# Patient Record
Sex: MALE | Race: WHITE | NOT HISPANIC OR LATINO | Employment: FULL TIME | URBAN - METROPOLITAN AREA
[De-identification: names, ages, dates, MRNs, and addresses within clinical notes are randomized per-mention and may not be internally consistent; named-entity substitution may affect disease eponyms.]

---

## 2017-08-27 ENCOUNTER — HOSPITAL ENCOUNTER (EMERGENCY)
Facility: HOSPITAL | Age: 37
Discharge: HOME | End: 2017-08-27
Attending: EMERGENCY MEDICINE
Payer: COMMERCIAL

## 2017-08-27 VITALS
SYSTOLIC BLOOD PRESSURE: 138 MMHG | WEIGHT: 173 LBS | RESPIRATION RATE: 16 BRPM | HEART RATE: 98 BPM | DIASTOLIC BLOOD PRESSURE: 80 MMHG | OXYGEN SATURATION: 100 % | TEMPERATURE: 99 F | HEIGHT: 72 IN | BODY MASS INDEX: 23.43 KG/M2

## 2017-08-27 DIAGNOSIS — K11.20 SIALOADENITIS: Primary | ICD-10-CM

## 2017-08-27 LAB
BASOPHILS # BLD AUTO: 0.02 K/UL
BASOPHILS NFR BLD: 0.1 %
BUN SERPL-MCNC: 15 MG/DL (ref 6–30)
CHLORIDE SERPL-SCNC: 99 MMOL/L (ref 95–110)
CREAT SERPL-MCNC: 0.9 MG/DL (ref 0.5–1.4)
DIFFERENTIAL METHOD: ABNORMAL
EOSINOPHIL # BLD AUTO: 0.1 K/UL
EOSINOPHIL NFR BLD: 0.5 %
ERYTHROCYTE [DISTWIDTH] IN BLOOD BY AUTOMATED COUNT: 13.1 %
GLUCOSE SERPL-MCNC: 90 MG/DL (ref 70–110)
HCT VFR BLD AUTO: 42.3 %
HCT VFR BLD CALC: 45 %PCV (ref 36–54)
HGB BLD-MCNC: 14.6 G/DL
LYMPHOCYTES # BLD AUTO: 2.2 K/UL
LYMPHOCYTES NFR BLD: 16.6 %
MCH RBC QN AUTO: 30.8 PG
MCHC RBC AUTO-ENTMCNC: 34.5 G/DL
MCV RBC AUTO: 89 FL
MONOCYTES # BLD AUTO: 1.2 K/UL
MONOCYTES NFR BLD: 9.1 %
NEUTROPHILS # BLD AUTO: 9.9 K/UL
NEUTROPHILS NFR BLD: 73.5 %
PLATELET # BLD AUTO: 178 K/UL
PMV BLD AUTO: 10.4 FL
POC IONIZED CALCIUM: 1.3 MMOL/L (ref 1.06–1.42)
POC TCO2 (MEASURED): 29 MMOL/L (ref 23–29)
POTASSIUM BLD-SCNC: 4 MMOL/L (ref 3.5–5.1)
RBC # BLD AUTO: 4.74 M/UL
SAMPLE: NORMAL
SODIUM BLD-SCNC: 137 MMOL/L (ref 136–145)
WBC # BLD AUTO: 13.44 K/UL

## 2017-08-27 PROCEDURE — 63600175 PHARM REV CODE 636 W HCPCS: Performed by: PHYSICIAN ASSISTANT

## 2017-08-27 PROCEDURE — 86735 MUMPS ANTIBODY: CPT | Mod: 91

## 2017-08-27 PROCEDURE — 86703 HIV-1/HIV-2 1 RESULT ANTBDY: CPT

## 2017-08-27 PROCEDURE — 99284 EMERGENCY DEPT VISIT MOD MDM: CPT | Mod: 25

## 2017-08-27 PROCEDURE — 25500020 PHARM REV CODE 255: Performed by: EMERGENCY MEDICINE

## 2017-08-27 PROCEDURE — 25000003 PHARM REV CODE 250: Performed by: PHYSICIAN ASSISTANT

## 2017-08-27 PROCEDURE — 86665 EPSTEIN-BARR CAPSID VCA: CPT

## 2017-08-27 PROCEDURE — 96374 THER/PROPH/DIAG INJ IV PUSH: CPT

## 2017-08-27 PROCEDURE — 85025 COMPLETE CBC W/AUTO DIFF WBC: CPT

## 2017-08-27 PROCEDURE — 96361 HYDRATE IV INFUSION ADD-ON: CPT

## 2017-08-27 PROCEDURE — 99284 EMERGENCY DEPT VISIT MOD MDM: CPT | Mod: ,,, | Performed by: EMERGENCY MEDICINE

## 2017-08-27 RX ORDER — OXYCODONE AND ACETAMINOPHEN 5; 325 MG/1; MG/1
1 TABLET ORAL EVERY 6 HOURS PRN
Qty: 18 TABLET | Refills: 0 | Status: SHIPPED | OUTPATIENT
Start: 2017-08-27

## 2017-08-27 RX ORDER — AMOXICILLIN AND CLAVULANATE POTASSIUM 875; 125 MG/1; MG/1
1 TABLET, FILM COATED ORAL 2 TIMES DAILY
Qty: 14 TABLET | Refills: 0 | Status: SHIPPED | OUTPATIENT
Start: 2017-08-27

## 2017-08-27 RX ORDER — AMOXICILLIN AND CLAVULANATE POTASSIUM 875; 125 MG/1; MG/1
1 TABLET, FILM COATED ORAL
Status: COMPLETED | OUTPATIENT
Start: 2017-08-27 | End: 2017-08-27

## 2017-08-27 RX ORDER — DIPHENHYDRAMINE HYDROCHLORIDE 50 MG/ML
25 INJECTION INTRAMUSCULAR; INTRAVENOUS
Status: DISCONTINUED | OUTPATIENT
Start: 2017-08-27 | End: 2017-08-27

## 2017-08-27 RX ORDER — OXYCODONE HYDROCHLORIDE 5 MG/1
5 TABLET ORAL
Status: COMPLETED | OUTPATIENT
Start: 2017-08-27 | End: 2017-08-27

## 2017-08-27 RX ORDER — KETOROLAC TROMETHAMINE 30 MG/ML
10 INJECTION, SOLUTION INTRAMUSCULAR; INTRAVENOUS
Status: COMPLETED | OUTPATIENT
Start: 2017-08-27 | End: 2017-08-27

## 2017-08-27 RX ORDER — METHYLPREDNISOLONE SOD SUCC 125 MG
125 VIAL (EA) INJECTION
Status: DISCONTINUED | OUTPATIENT
Start: 2017-08-27 | End: 2017-08-27

## 2017-08-27 RX ORDER — FAMOTIDINE 10 MG/ML
20 INJECTION INTRAVENOUS
Status: DISCONTINUED | OUTPATIENT
Start: 2017-08-27 | End: 2017-08-27

## 2017-08-27 RX ADMIN — SODIUM CHLORIDE 1000 ML: 0.9 INJECTION, SOLUTION INTRAVENOUS at 12:08

## 2017-08-27 RX ADMIN — IOHEXOL 100 ML: 350 INJECTION, SOLUTION INTRAVENOUS at 01:08

## 2017-08-27 RX ADMIN — AMOXICILLIN AND CLAVULANATE POTASSIUM 1 TABLET: 875; 125 TABLET, FILM COATED ORAL at 02:08

## 2017-08-27 RX ADMIN — OXYCODONE HYDROCHLORIDE 5 MG: 5 TABLET ORAL at 01:08

## 2017-08-27 RX ADMIN — KETOROLAC TROMETHAMINE 10 MG: 30 INJECTION, SOLUTION INTRAMUSCULAR at 12:08

## 2017-08-27 NOTE — ED PROVIDER NOTES
Encounter Date: 8/27/2017    SCRIBE #1 NOTE: I, Anthony Robles, am scribing for, and in the presence of,  Dr. Moya. I have scribed the following portions of the note - the APC attestation.       History     Chief Complaint   Patient presents with    Facial Swelling     facial swelling  getting worse since yesterday - difficulty swallowing.     Allergic Reaction     35 y/o M with no significant PMHx presents to the ED c/o facial and neck swelling. He had some facial swelling and pain yesterday that improved with OTC ibuprofen. He woke up this morning and noticed significant facial swelling (R>L) and neck swelling. He does report some dysphagia and subjective fever. He denies tongue swelling, lip swelling, throat swelling, trismus. He denies any recent dental procedures, dental pain or new medications. He denies chills, chest pain, SOB, abdominal pain, n/v, neck stiffness, headache, ear pain, changes in vision.       The history is provided by the patient.     Review of patient's allergies indicates:  No Known Allergies  Past Medical History:   Diagnosis Date    ADHD (attention deficit hyperactivity disorder)      History reviewed. No pertinent surgical history.  Family History   Problem Relation Age of Onset    Cancer Mother      ovarian     Diabetes Mother     Hypertension Father     Depression Father      Social History   Substance Use Topics    Smoking status: Never Smoker    Smokeless tobacco: Never Used    Alcohol use 1.5 oz/week     3 Standard drinks or equivalent per week      Comment: 1 x week     Review of Systems   Constitutional: Negative for chills and fever.   HENT: Positive for facial swelling and trouble swallowing. Negative for congestion, dental problem, ear pain, rhinorrhea, sore throat and voice change.         No tongue or lip swelling.  No throat swelling.   Eyes: Negative for photophobia and visual disturbance.   Respiratory: Negative for shortness of breath.    Cardiovascular:  Negative for chest pain.   Gastrointestinal: Negative for abdominal pain, constipation, diarrhea, nausea and vomiting.   Genitourinary: Negative for dysuria.   Musculoskeletal: Negative for neck pain and neck stiffness.   Skin: Negative for wound.   Neurological: Negative for syncope, weakness, numbness and headaches.   Psychiatric/Behavioral: Negative for confusion.       Physical Exam     Initial Vitals [08/27/17 1208]   BP Pulse Resp Temp SpO2   130/73 108 16 98.9 °F (37.2 °C) 97 %      MAP       92         Physical Exam    Nursing note and vitals reviewed.  Constitutional: He appears well-developed and well-nourished. He is not diaphoretic. No distress.   HENT:   Head: Atraumatic.   Right Ear: Tympanic membrane, external ear and ear canal normal.   Left Ear: Tympanic membrane, external ear and ear canal normal.   Mouth/Throat: Oropharynx is clear and moist and mucous membranes are normal. No trismus in the jaw. No uvula swelling.   Bilateral lower facial swelling noted (R>L) with tenderness. No facial cellulitis.   Neck: Normal range of motion. Neck supple. No spinous process tenderness and no muscular tenderness present. Normal range of motion present. No neck rigidity.   Anterior neck swelling with tender cervical lymphadenopathy noted   Cardiovascular: Regular rhythm and normal heart sounds. Tachycardia present.  Exam reveals no gallop and no friction rub.    No murmur heard.  Pulmonary/Chest: Breath sounds normal. He has no wheezes. He has no rhonchi. He has no rales.   Abdominal: Soft. Bowel sounds are normal. There is no tenderness. There is no rebound and no guarding.   Musculoskeletal: Normal range of motion.   Neurological: He is alert and oriented to person, place, and time.   Skin: Skin is warm and dry. No rash noted. No erythema.   Psychiatric: He has a normal mood and affect.         ED Course   Procedures  Labs Reviewed   CBC W/ AUTO DIFFERENTIAL - Abnormal; Notable for the following:        Result  Value    WBC 13.44 (*)     Gran # 9.9 (*)     Mono # 1.2 (*)     Gran% 73.5 (*)     Lymph% 16.6 (*)     All other components within normal limits   MEGAN-JERRY VIRUS ANTIBODY PANEL   MUMPS VIRUS ANTIBODIES, IGG AND IGM   HIV 1 / 2 ANTIBODY   ISTAT PROCEDURE        Imaging Results          CT Soft Tissue Neck With Contrast (Final result)  Result time 08/27/17 14:00:14    Final result by Rip Holbrook MD (08/27/17 14:00:14)                 Impression:        Diffuse sialoadenitis without associated sialolith or abscess.    Asymmetric inflammation along the platysma, greater on the right than the left, and mild cervical adenopathy, likely reactive.    ______________________________________     Electronically signed by resident: DURAN PASCAL MD  Date:     08/27/17  Time:    13:46            As the supervising and teaching physician, I personally reviewed the images and resident's interpretation and I agree with the findings.          Electronically signed by: RIP HOLBROOK  Date:     08/27/17  Time:    14:00              Narrative:    HISTORY: 36-year-old male with right worse than left lower facial and neck swelling.    TECHNIQUE: Axial CT images acquired from the skull base through the thoracic inlet after administration of 100 cc of Omnipaque 350  IV contrast.  Multiplanar reconstructions provided for review.    COMPARISON: N/A    FINDINGS:     Aerodigestive tract: Normal.    Lymph nodes: Mildly prominent cervical lymph nodes bilaterally, the majority of which are not pathologically enlarged. An enlarged node is present in the right level II measuring 1.2 cm in short axis.    Salivary glands: Diffusely swollen heterogeneous parotids and submandibular glands bilaterally, without focal ovale or sialolith.  Small amount of inflammatory fat stranding/fluid is present adjacent to the salivary glands.    Thyroid: Normal.    Thoracic inlet: Normal.    Vascular structures: Normal.    Orbits/paranasal sinuses/skull base:  Normal.    Cervical spine: Mild disc/endplate degeneration with mild bilateral uncovertebral hypertrophy at C5-C6 resulting in mild foraminal stenoses.    Other findings: Mild soft tissue thickening and inflammation extends in the subcutaneous tissues along the platysma musculature, greater on the right than on the left.                                 Medical Decision Making:   History:   Old Medical Records: I decided to obtain old medical records.  Clinical Tests:   Lab Tests: Ordered and Reviewed  Radiological Study: Ordered and Reviewed  Other:   I have discussed this case with another health care provider.       APC / Resident Notes:   37 y/o M with no significant PMHx presents to the ED c/o facial and neck swelling.  Tachycardic. Regular rhythm. Lungs CTA. Abdomen soft. No neck stiffness. Bilateral lower facial swelling noted (R>L). No facial cellulitis. No posterior oropharyngeal erythema or edema appreciated. Tender anterior cervical lymph nodes. No signs of AOM. No trismus. DDx includes but is not limited to parotiditis, infection, sialadenitis. mumps. Will get labs and CT neck.     CBC shows leukocytosis with WBC 13.44. Chem8 with no abnormalities.    CT neck shows diffuse sialoadenitis without sialolith or abscess. Asymmetric inflammation along the playsma, R>L and mild cervical adenopathy.    Etiology possibly due to mumps. Labs added - Mumps antibodies, EBV antibody, HIV.     Stable for discharge.    He was discharged with prescriptions for augmentin (first dose given in the ED) and percocet.  He will follow up with Infectious Disease - Dr Carr.  All of the patient's questions were answered.  I reviewed the patient's chart, labs, and imaging and discussed the case with my supervising physician.          Scribe Attestation:   Scribe #1: I performed the above scribed service and the documentation accurately describes the services I performed. I attest to the accuracy of the note.    Attending  Attestation:     Physician Attestation Statement for NP/PA:   I have conducted a face to face encounter with this patient in addition to the NP/PA, due to Medical Complexity    Other NP/PA Attestation Additions:    History of Present Illness: Pt presenting with facial swelling.   Physical Exam: On exam pt with swelling to salivary glands, oropharynx clear, no acute distress,    Medical Decision Making: Will obtain CT neck, labs and discuss case with internal medicine. Possibly mumps. Will DC with Augmentin.        Physician Attestation for Scribe:  Physician Attestation Statement for Scribe #1: I, Dr. Moya, reviewed documentation, as scribed by Anthony Robles in my presence, and it is both accurate and complete.                 ED Course     Clinical Impression:   The encounter diagnosis was Sialoadenitis.    Disposition:   Disposition: Discharged  Condition: Stable                        Yolette Zambrano PA-C  08/27/17 8345

## 2017-08-27 NOTE — ED NOTES
Pt presented to the ED c/o facial swelling and neck swelling. Airway patent. Pt alert. Pt states it hurts to swallow. Pt denies sob. Pt states the swelling started yesterday.

## 2017-08-27 NOTE — ED NOTES
Pt identifiers Yosvany Arguetahecked and correct  LOC: The patient is awake, alert, aware of environment with an appropriate affect. Oriented x3, speaking appropriately  APPEARANCE: Pt resting comfortably, in no acute distress, pt is clean and well groomed, clothing properly fastened  SKIN: Skin warm, dry and intact, normal skin turgor, moist mucus membranes, facial and neck swelling noted.   RESPIRATORY: Airway is open and patent, respirations are spontaneous, even and unlabored, normal effort and rate  CARDIAC: Normal rate and rhythm, no peripheral edema noted, capillary refill < 3 seconds, bilateral radial pulses 2+  ABDOMEN: Soft, non tender, non distended. Bowel sounds present x 4 quadrants.    NEUROLOGIC: PERRLA, facial expression is symmetrical, patient moving all extremities spontaneously, normal sensation in all extremities when touched with a finger.  Follows all commands appropriately  MUSCULOSKELETAL: No obvious deformities.

## 2017-08-28 LAB — HIV 1+2 AB+HIV1 P24 AG SERPL QL IA: NEGATIVE

## 2017-08-29 LAB
EBV EA AB TITR SER: 6.8 U/ML
EBV NA IGG SER QL: 263 U/ML
EBV VCA IGG SER QL: 217 U/ML
EBV VCA IGM SER-ACNC: <10 U/ML

## 2017-08-30 LAB
MUV IGG SER IA-ACNC: 2
MUV IGG SER QL IA: POSITIVE
MUV IGM SER IA-ACNC: 0.07 (ref 0–0.79)
MUV IGM SER QL IA: NEGATIVE

## 2017-08-31 ENCOUNTER — OFFICE VISIT (OUTPATIENT)
Dept: INFECTIOUS DISEASES | Facility: CLINIC | Age: 37
End: 2017-08-31
Payer: COMMERCIAL

## 2017-08-31 VITALS
SYSTOLIC BLOOD PRESSURE: 125 MMHG | HEIGHT: 72 IN | DIASTOLIC BLOOD PRESSURE: 73 MMHG | BODY MASS INDEX: 25.59 KG/M2 | TEMPERATURE: 98 F | WEIGHT: 188.94 LBS | HEART RATE: 72 BPM

## 2017-08-31 DIAGNOSIS — I88.9 CERVICAL LYMPHADENITIS: Primary | ICD-10-CM

## 2017-08-31 PROCEDURE — 99203 OFFICE O/P NEW LOW 30 MIN: CPT | Mod: S$GLB,,, | Performed by: INTERNAL MEDICINE

## 2017-08-31 PROCEDURE — 99999 PR PBB SHADOW E&M-EST. PATIENT-LVL III: CPT | Mod: PBBFAC,,, | Performed by: INTERNAL MEDICINE

## 2017-08-31 RX ORDER — BUPROPION HYDROCHLORIDE 150 MG/1
150 TABLET ORAL 2 TIMES DAILY
Refills: 2 | COMMUNITY
Start: 2017-06-02 | End: 2017-08-31

## 2017-08-31 NOTE — PROGRESS NOTES
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    Chief Complaint:No chief complaint on file.      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  On Saturday morning he wok up and noticed enlarged lymph nodes.  Took advil and it improved.  Woke up Sunday and right side was severely swollen and red.  Then left side.  Both sides were swollen.Took advil.  Then went to work.  Throat then began swelling.  He presented to the ER.  Imaging suggested swollen salivary glands.  HIV and EBV testing was performed and wasn't remarkable.  He was discharged on augmentin empirically.  He is feeling much better today.    No travel history.  He doesn't own any pets.      Review of Systems   Constitution: Negative for chills, decreased appetite, fever, weakness, malaise/fatigue, night sweats, weight gain and weight loss.   HENT: Positive for ear pain. Negative for congestion, headaches, hearing loss, hoarse voice, sore throat and tinnitus.    Eyes: Negative for blurred vision, redness and visual disturbance.   Cardiovascular: Negative for chest pain, leg swelling and palpitations.   Respiratory: Negative for cough, hemoptysis, shortness of breath and sputum production.    Hematologic/Lymphatic: Positive for adenopathy. Does not bruise/bleed easily.   Skin: Negative for dry skin, itching, rash and suspicious lesions.   Musculoskeletal: Negative for back pain, joint pain, myalgias and neck pain.   Gastrointestinal: Negative for abdominal pain, constipation, diarrhea, heartburn, nausea and vomiting.   Genitourinary: Negative for dysuria, flank pain, frequency, hematuria, hesitancy and urgency.   Neurological: Negative for dizziness, numbness and paresthesias.   Psychiatric/Behavioral: Negative for depression and memory loss. The patient does not have insomnia and is not  nervous/anxious.      Objective:   Physical Exam   Constitutional: He is oriented to person, place, and time. He appears well-developed and well-nourished. No distress.   HENT:   Head: Normocephalic and atraumatic.   Right Ear: External ear normal.   Left Ear: External ear normal.   Nose: Nose normal.   Mouth/Throat: Oropharynx is clear and moist. No oropharyngeal exudate.   Eyes: Conjunctivae and EOM are normal. Pupils are equal, round, and reactive to light. Right eye exhibits no discharge. Left eye exhibits no discharge. No scleral icterus.   Neck: Normal range of motion. Neck supple. No JVD present. No tracheal deviation present. No thyromegaly present.   Cardiovascular: Normal rate, regular rhythm, normal heart sounds and intact distal pulses.  Exam reveals no gallop and no friction rub.    No murmur heard.  Pulmonary/Chest: Effort normal and breath sounds normal. No stridor. No respiratory distress. He has no wheezes. He has no rales. He exhibits no tenderness.   Abdominal: Soft. Bowel sounds are normal. He exhibits no distension and no mass. There is no tenderness. There is no rebound and no guarding.   Musculoskeletal: Normal range of motion. He exhibits no edema or tenderness.   Lymphadenopathy:     He has no cervical adenopathy.   Neurological: He is alert and oriented to person, place, and time. He has normal reflexes. He displays normal reflexes. No cranial nerve deficit. He exhibits normal muscle tone. Coordination normal.   Skin: Skin is warm. No rash noted. He is not diaphoretic. No erythema. No pallor.   Psychiatric: He has a normal mood and affect. His behavior is normal. Judgment and thought content normal.   Nursing note and vitals reviewed.    Assessment:       1. Cervical lymphadenitis        The cause of his cervical lymphadenitis is not clear.  Work up has included serologies for HIV and EBV which hasn't been remarkable.  Suspect some type of viral syndrome.  Will check CMV today.    Plan:        Cervical lymphadenitis  -     CBC auto differential; Future; Expected date: 08/31/2017  -     Cytomegalovirus (Cmv) Ab, Igm; Future; Expected date: 08/31/2017  -     Cytomegalovirus antibody, IgG; Future; Expected date: 08/31/2018

## 2017-11-02 ENCOUNTER — TELEPHONE (OUTPATIENT)
Dept: INTERNAL MEDICINE | Facility: CLINIC | Age: 37
End: 2017-11-02

## 2017-11-02 NOTE — TELEPHONE ENCOUNTER
----- Message from Maritza Wilhelm sent at 11/2/2017 11:39 AM CDT -----  Contact: Self. 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 Not in office this week but back in Tuesday next week.

## 2018-07-13 ENCOUNTER — TELEPHONE (OUTPATIENT)
Dept: PHARMACY | Facility: CLINIC | Age: 38
End: 2018-07-13

## 2018-08-07 ENCOUNTER — APPOINTMENT (RX ONLY)
Dept: URBAN - METROPOLITAN AREA CLINIC 39 | Facility: CLINIC | Age: 38
Setting detail: DERMATOLOGY
End: 2018-08-07

## 2019-04-11 RX ORDER — DEXTROAMPHETAMINE SACCHARATE, AMPHETAMINE ASPARTATE, DEXTROAMPHETAMINE SULFATE AND AMPHETAMINE SULFATE 7.5; 7.5; 7.5; 7.5 MG/1; MG/1; MG/1; MG/1
30 TABLET ORAL 2 TIMES DAILY
Qty: 60 TABLET | Refills: 0 | Status: CANCELLED | OUTPATIENT
Start: 2019-04-11 | End: 2019-07-10

## 2019-05-07 ENCOUNTER — OFFICE VISIT (OUTPATIENT)
Dept: DERMATOLOGY | Facility: CLINIC | Age: 39
End: 2019-05-07
Payer: COMMERCIAL

## 2019-05-07 DIAGNOSIS — B08.1 MOLLUSCUM CONTAGIOSUM: Primary | ICD-10-CM

## 2019-05-07 DIAGNOSIS — B36.0 TINEA VERSICOLOR: ICD-10-CM

## 2019-05-07 PROCEDURE — 99212 OFFICE O/P EST SF 10 MIN: CPT | Mod: 25,S$GLB,, | Performed by: NURSE PRACTITIONER

## 2019-05-07 PROCEDURE — 17110 PR DESTRUCTION BENIGN LESIONS UP TO 14: ICD-10-PCS | Mod: S$GLB,,, | Performed by: NURSE PRACTITIONER

## 2019-05-07 PROCEDURE — 99999 PR PBB SHADOW E&M-EST. PATIENT-LVL II: CPT | Mod: PBBFAC,,, | Performed by: NURSE PRACTITIONER

## 2019-05-07 PROCEDURE — 99999 PR PBB SHADOW E&M-EST. PATIENT-LVL II: ICD-10-PCS | Mod: PBBFAC,,, | Performed by: NURSE PRACTITIONER

## 2019-05-07 PROCEDURE — 87220 PR  TISSUE EXAM BY KOH: ICD-10-PCS | Mod: S$GLB,,, | Performed by: NURSE PRACTITIONER

## 2019-05-07 PROCEDURE — 17110 DESTRUCTION B9 LES UP TO 14: CPT | Mod: S$GLB,,, | Performed by: NURSE PRACTITIONER

## 2019-05-07 PROCEDURE — 99212 PR OFFICE/OUTPT VISIT, EST, LEVL II, 10-19 MIN: ICD-10-PCS | Mod: 25,S$GLB,, | Performed by: NURSE PRACTITIONER

## 2019-05-07 PROCEDURE — 87220 TISSUE EXAM FOR FUNGI: CPT | Mod: S$GLB,,, | Performed by: NURSE PRACTITIONER

## 2019-05-07 RX ORDER — FLUCONAZOLE 200 MG/1
TABLET ORAL
Qty: 4 TABLET | Refills: 0 | Status: SHIPPED | OUTPATIENT
Start: 2019-05-07

## 2019-05-07 RX ORDER — KETOCONAZOLE 20 MG/G
CREAM TOPICAL
Qty: 60 G | Refills: 3 | Status: SHIPPED | OUTPATIENT
Start: 2019-05-07

## 2019-05-07 NOTE — PROGRESS NOTES
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   Chief Complaint   Patient presents with    Rash     neck and chest , [id number], spreading, no tx     Folliculitis     abdomen, [id number], raised, bleed at times, no tx      Rash  - Initial  Affected locations: neck and chest  Duration: 2 months  Severity: mild to moderate  Timing: constant  Relieving factors/Treatments tried: nothing  Improvement on treatment: no relief    Folliculitis  - Initial  Affected locations: torso  Duration: 2 months  Signs / symptoms: bleeding  Severity: mild to moderate  Timing: constant  Aggravated by: pressure, picking and friction  Relieving factors/Treatments tried: antibiotics  Improvement on treatment: no relief        Review of Systems   Constitutional: Negative for fever, chills and fatigue.   Skin: Positive for rash.        Objective:    Physical Exam   Constitutional: He appears well-developed and well-nourished. No distress.   Neurological: He is alert and oriented to person, place, and time. He is not disoriented.   Psychiatric: He has a normal mood and affect.   Skin:   Areas Examined (abnormalities noted in diagram):   Head / Face Inspection Performed  Neck Inspection Performed  Chest / Axilla Inspection Performed  Back Inspection Performed  RUE Inspected  LUE Inspection Performed              Diagram Legend     Erythematous scaling macule/papule c/w actinic keratosis       Vascular papule c/w angioma      Pigmented verrucoid papule/plaque c/w seborrheic keratosis      Yellow umbilicated papule c/w sebaceous hyperplasia      Irregularly shaped tan macule c/w lentigo     1-2 mm smooth white papules consistent with Milia      Movable subcutaneous cyst with punctum c/w epidermal inclusion cyst      Subcutaneous movable cyst c/w pilar cyst      Firm pink to brown papule c/w dermatofibroma      Pedunculated fleshy papule(s) c/w skin tag(s)      Evenly pigmented macule c/w junctional nevus      Mildly variegated pigmented, slightly irregular-bordered macule c/w mildly atypical nevus      Flesh colored to evenly pigmented papule c/w intradermal nevus       Pink pearly papule/plaque c/w basal cell carcinoma      Erythematous hyperkeratotic cursted plaque c/w SCC      Surgical scar with no sign of skin cancer recurrence      Open and closed comedones      Inflammatory papules and pustules      Verrucoid papule consistent consistent with wart     Erythematous eczematous patches and plaques     Dystrophic onycholytic nail with subungual debris c/w onychomycosis     Umbilicated papule    Erythematous-base heme-crusted tan verrucoid plaque consistent with inflamed seborrheic keratosis     Erythematous Silvery Scaling Plaque c/w Psoriasis     See annotation      Assessment / Plan:        Molluscum contagiosum  Discussed treatment options including but not limited to observation (molluscum have a tendency to spontaneous resolution), cryosurgery, destruction via pinch expression, Aldara cream with risks and benefits of each.    Pinch expression procedure note:  Pinch expression after areas cleaned with alcohol and anesthetized with pain ease EMLA spray 10 lesions on patient's abdomen. Patient tolerated well.   Demonstrated pinch technique to patient and patient's parents.      Tinea versicolor  +KOH  -     ketoconazole (NIZORAL) 2 % cream; Apply topically to affected area twice daily  Dispense: 60 g; Refill: 3  -     fluconazole (DIFLUCAN) 200 MG Tab; Take 1 tablet by mouth twice weekly for 2 weeks.  Dispense: 4 tablet; Refill: 0    Discussed TV is an overgrowth of yeast on the skin. Yeast normally lives on our skin surface, however, when living in hot and humid climates yeast will grow more quickly. An overgrowth of yeast on the skin can cause Tv. TV is not contagious.              Follow up if symptoms worsen or fail to improve.

## 2019-05-07 NOTE — PATIENT INSTRUCTIONS
Molluscum Contagiosum         Your doctor has diagnosed you with molluscum contagiosum, a viral infection of the skin. This is a very common condition seen primarily in young children. Sometimes, your body will fight the infection and they will resolve on their own. This may take months to years. Often, your doctor chooses to treat them because they can spread rapidly, and they can be transmitted to others via direct contact or through swimming pools or baths. There are several ways to treat molluscum, and you and your doctor together can decide which is best for your child. Your child is safe to go to school as long as the areas are covered by their clothes or with band aids.         The most common methods are cantharadin- a blistering agent applied in the office, freezing with liquid nitrogen, numbing the lesion and pinching it out, and topical medicines applied at home. Whether molluscum is treated or not, when it resolves, it leaves pox-like scars that will resolve, but will take months to years to completely fade. Sometimes, it takes several treatments to clear the molluscum. New lesions may appear in crops and this may last several months.           What should I expect if I have been treated with Cantharadin?                The medicine is applied in the office. It should be washed off in 4 hours, or as soon as a blister to appear, whichever comes first. Mild stinging may occur with this treatment. It can be treated with Tylenol, Motrin, or benadryl. Cool compresses may help as well. If the area becomes raw, Bactroban cream provided by your doctor should be applied and the area covered with a band aid.                 If a blister forms and is tender, the blister can be popped with a needle sterilized with rubbing alcohol. Wash the area thoroughly after to kill any virus in the blister fluid. Bactroban cream and a band aid can be applied as needed.                The blister should resolve within 1 week.  Often, there will be a dark spot or a light spot in the area treated. This is the bodys response to an irritation of the skin and will fade with time.             What should I expect if I have been treated with liquid nitrogen?                 Treating with liquid nitrogen will cause an immediate mild to moderate burning and stinging as it is applied in the office. The treated area will be red and often a blister will form. If a blister forms and is tender, the blister can be popped with a needle sterilized with rubbing alcohol. Wash the area thoroughly after to kill any virus in the blister fluid. Bactroban cream and a band aid can be applied as needed. You will most likely need multiple visits to treat all of the lesions.               How do I treat the molluscum topically?                  Your doctor has prescribed a cream to be applied to the lesions once daily. This method may be more time consuming and take several weeks to achieve a response. The medication is applied to the areas and allowed to dry. Sometimes your doctor may have you cover the areas with tape or a band aid. When the lesions become red and irritated, this means that your body is fighting the virus and that you can stop using the medication. You should stop using the medication at any time and call your doctor if you are concerned about your response. Not everyone will respond to topical treatment.   Aldara- Pop a small hole in the packet and squeeze out only the necessary amount of medicine. Apply to individual lesions and allow to dry. Be sure to wash hands after application and avoid contact with eyes or mouth. If the lesion is red, swollen, and irritated, you can stop the medication. May need to treat for several weeks to response. Not everyone responds to this medication.    Tazorac- Apply to individual lesions at night and cover with tape (preferably Blenderm). Will cause redness, irritation, and peeling.  May take several  weeks to respond. Not everyone responds to this treatment.    Other less frequently prescribes medications include efudex, carac, Occlusal HP (over the counter)  You can pinch out a few lesions each night at home; Numb the area with an ice cube for 1 minute, pinch the base of the lesion with a tweezers, and pop out the core. With this method, you can expect mild pain, bleeding, redness, or a bruise. It is a very effective method if your child and you can tolerate it. DO NOT USE THIS METHOD ON THE FACE

## 2020-01-22 DIAGNOSIS — Z23 NEED FOR HEPATITIS A VACCINATION: Primary | ICD-10-CM

## 2020-04-21 DIAGNOSIS — Z01.84 ANTIBODY RESPONSE EXAMINATION: ICD-10-CM

## 2020-05-21 DIAGNOSIS — Z01.84 ANTIBODY RESPONSE EXAMINATION: ICD-10-CM

## 2020-06-20 DIAGNOSIS — Z01.84 ANTIBODY RESPONSE EXAMINATION: ICD-10-CM

## 2020-07-20 DIAGNOSIS — Z01.84 ANTIBODY RESPONSE EXAMINATION: ICD-10-CM

## 2020-08-19 DIAGNOSIS — Z01.84 ANTIBODY RESPONSE EXAMINATION: ICD-10-CM

## 2020-09-18 DIAGNOSIS — Z01.84 ANTIBODY RESPONSE EXAMINATION: ICD-10-CM

## 2020-10-18 DIAGNOSIS — Z01.84 ANTIBODY RESPONSE EXAMINATION: ICD-10-CM

## 2020-11-17 DIAGNOSIS — Z01.84 ANTIBODY RESPONSE EXAMINATION: ICD-10-CM

## 2020-12-17 ENCOUNTER — IMMUNIZATION (OUTPATIENT)
Dept: INTERNAL MEDICINE | Facility: CLINIC | Age: 40
End: 2020-12-17
Payer: COMMERCIAL

## 2020-12-17 DIAGNOSIS — Z23 NEED FOR VACCINATION: ICD-10-CM

## 2020-12-17 PROCEDURE — 91300 COVID-19, MRNA, LNP-S, PF, 30 MCG/0.3 ML DOSE VACCINE: CPT | Mod: ,,, | Performed by: INTERNAL MEDICINE

## 2020-12-17 PROCEDURE — 0001A COVID-19, MRNA, LNP-S, PF, 30 MCG/0.3 ML DOSE VACCINE: ICD-10-PCS | Mod: CV19,,, | Performed by: INTERNAL MEDICINE

## 2020-12-17 PROCEDURE — 0001A COVID-19, MRNA, LNP-S, PF, 30 MCG/0.3 ML DOSE VACCINE: CPT | Mod: CV19,,, | Performed by: INTERNAL MEDICINE

## 2020-12-17 PROCEDURE — 91300 COVID-19, MRNA, LNP-S, PF, 30 MCG/0.3 ML DOSE VACCINE: ICD-10-PCS | Mod: ,,, | Performed by: INTERNAL MEDICINE

## 2021-01-15 ENCOUNTER — IMMUNIZATION (OUTPATIENT)
Dept: INTERNAL MEDICINE | Facility: CLINIC | Age: 41
End: 2021-01-15
Payer: COMMERCIAL

## 2021-01-15 DIAGNOSIS — Z23 NEED FOR VACCINATION: Primary | ICD-10-CM

## 2021-01-15 PROCEDURE — 0002A COVID-19, MRNA, LNP-S, PF, 30 MCG/0.3 ML DOSE VACCINE: CPT | Mod: PBBFAC | Performed by: INTERNAL MEDICINE

## 2021-01-15 PROCEDURE — 91300 COVID-19, MRNA, LNP-S, PF, 30 MCG/0.3 ML DOSE VACCINE: CPT | Mod: PBBFAC | Performed by: INTERNAL MEDICINE
